# Patient Record
Sex: MALE | Race: WHITE
[De-identification: names, ages, dates, MRNs, and addresses within clinical notes are randomized per-mention and may not be internally consistent; named-entity substitution may affect disease eponyms.]

---

## 2019-01-31 ENCOUNTER — HOSPITAL ENCOUNTER (EMERGENCY)
Dept: HOSPITAL 36 - ER | Age: 54
Discharge: HOME | End: 2019-01-31
Payer: COMMERCIAL
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
## 2019-01-31 DIAGNOSIS — E11.9: ICD-10-CM

## 2019-01-31 DIAGNOSIS — S29.011A: Primary | ICD-10-CM
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
## 2019-01-31 DIAGNOSIS — I10: ICD-10-CM

## 2019-01-31 DIAGNOSIS — Z87.891: ICD-10-CM
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
## 2019-01-31 DIAGNOSIS — Z98.890: ICD-10-CM

## 2019-01-31 PROCEDURE — 99283 EMERGENCY DEPT VISIT LOW MDM: CPT

## 2019-01-31 PROCEDURE — 96372 THER/PROPH/DIAG INJ SC/IM: CPT

## 2019-01-31 PROCEDURE — Z7502: HCPCS

## 2019-01-31 PROCEDURE — 71045 X-RAY EXAM CHEST 1 VIEW: CPT

## 2019-01-31 NOTE — ED PHYSICIAN CHART
ED Chief Complaint/HPI





- Patient Information


Date Seen:: 01/31/19


Time Seen:: 10:00


Chief Complaint:: chest pain


History of Present Illness:: 





Patient developed left-sided chest pain and left mid back pain 6 days ago while 

coughing.  Patient had sore throat and cough both of which have greatly 

improved to totally subsided.  Patient quit smoking 2 weeks ago.


Allergies:: 


 Allergies











Allergy/AdvReac Type Severity Reaction Status Date / Time


 


No Known Allergies Allergy   Verified 01/31/19 09:40











Vitals:: 


 Vital Signs - 8 hr











  01/31/19





  09:33


 


Temp 98.3 F


 





 


RR 18


 


/108


 


O2 Sat % 96











Historian:: Patient


Review:: Nurse's Note Reviewed





ED Review of Systems





- Review of Systems


General/Constitutional: No fever, No chills, No weight loss, No weakness, No 

diaphoresis, No edema, No loss of appetite


Skin: No skin lesions, No rash, No bruising


Head: No headache, No light-headedness


Eyes: No loss of vision, No pain, No diplopia


ENT: No earache, No nasal drainage, No sore throat, No tinnitus


Neck: No neck pain, No swelling, No thyromegaly, No stiffness, No mass noted


Cardio Vascular: No chest pain, No palpitations, No PND, No orthopnea, No edema


Pulmonary: No SOB, No cough, No sputum, No wheezing, Other (chest wall pain)


GI: No nausea, No vomiting, No diarrhea, No pain, No melena, No hematochezia, 

No constipation, No hematemesis


G/U: No dysuria, No frequency, No hematuria


Musculoskeletal: No bone or joint pain, No back pain, No muscle pain, Other (

see history and physical)


Endocrine: No polyuria, No polydipsia


Psychiatric: No prior psych history, No depression, No anxiety, No suicidal 

ideation


Hematopoietic: No bruising, No lymphadenopathy


Allergic/Immuno: No urticaria, No angioedema


Neurological: No syncope, No focal symptoms, No weakness, No paresthesia, No 

headache, No seizure, No dizziness, No confusion, No vertigo





ED Past Medical History





- Past Medical History


Past Medical History: HTN, DM


Family History: Heart disease, Diabetes Melitus, Other (family history 

congestive heart failure)


Social History: Non Smoker


Surgical History: other (right wrist surgery for fracture and subcutaneous cyst 

right shoulder)


Medication: Reviewed





Family Medical History





- Family Member


  ** Father


History Unknown: Yes





ED Physical Exam





- Physical Examination


General/Constitutional: Awake, Well-developed, well-nourished, Alert, No 

distress, GCS 15, Non-toxic appearing, Ambulatory


Head: Atraumatic


Eyes: Lids, conjuctiva normal, PERRL, EOMI


Skin: Nl inspection, No rash, No skin lesions, No ecchymosis, Well hydrated, No 

lymphadenopathy


ENMT: External ears, nose nl, Nasal exam nl, Lips, teeth, gums nl


Neck: Nontender, Full ROM w/o pain, No JVD, No nuchal rigidity, No bruit, No 

mass, No stridor


Respiratory: Nl effort/Exclusion, Clear to Auscultation


Other Respiratory comments:: 





Tenderness left inferior chest wall and minimal rales right mid lung field; 

otherwise chest is clear


Cardio Vascular: RRR, No murmur, gallop, rubs, NL S1 S2


GI: No tenderness/rebounding/guarding, No organomegaly, No hernia, Normal BS's, 

Nondistended, No mass/bruits, No McBurney tenderness


: No CVA tenderness


Extremities: No tenderness or effusion, Full ROM, normal strength in all 

extremities, No edema, Normal digits & nails


Neuro/Psych: Alert/oriented, DTR's symmetric, Normal sensory exam, Normal motor 

strength, Judgement/insight normal, Mood normal, Normal gait, No focal deficits


Misc: Normal back, No paraspinal tenderness





ED Labs/Radiology/EKG Results





- Lab Results


Comments:: 





Chest x-ray is negative





- Radiology Results


Results: 


One view chest x-ray negative








ED Assessment





- Assessment


General Assessment: 


Patient apparently tore intercostal muscles left hemithorax while coughing.  

Patient requests light duty as he works as a  and will be restricted to 

no lifting more than 15 pounds for one week








ED Septic Shock





- .


Is Septic Shock (SBP<90, OR Lactate>4 mmol\L) present?: No





- <6hrs of presentation:


Vital Signs: 


 Vital Signs - 8 hr











  01/31/19





  09:33


 


Temp 98.3 F


 





 


RR 18


 


/108


 


O2 Sat % 96














ED Reassessment (Disposition)





- Reassessment


Reassessment:: 





Patient received slight improvement after the Toradol 30 mg intramuscularly


Reassessment Condition:: Improved





- Diagnosis


Diagnosis:: 





Chest wall strain





- Aftercare/Follow up Instructions


Aftercare/Follow-Up Instructions:: Refer to Discharge Instructions


Medication Prescribed:: 





Ibuprofen 800 mg #20 to take 1 3 times a day





- Patient Disposition


Discharge/Transfer:: Home


Condition at Disposition:: Stable, Improved

## 2019-01-31 NOTE — DIAGNOSTIC IMAGING REPORT
CHEST X-RAY: AP view



INDICATION: Left anterior chest wall pain after coughing



COMPARISON: None



FINDINGS: There is no focal consolidation or pleural effusions The heart

is normal in size.  No evidence of pneumothorax.  Degenerative changes

of the spine are noted.  There may be minimal atherosclerosis of the

aortic arch.



IMPRESSION:



No focal consolidation or evidence of pneumothorax.